# Patient Record
Sex: FEMALE | URBAN - METROPOLITAN AREA
[De-identification: names, ages, dates, MRNs, and addresses within clinical notes are randomized per-mention and may not be internally consistent; named-entity substitution may affect disease eponyms.]

---

## 2022-07-19 ENCOUNTER — OFFICE VISIT (OUTPATIENT)
Dept: URBAN - METROPOLITAN AREA CLINIC 81 | Facility: CLINIC | Age: 47
End: 2022-07-19

## 2022-07-19 DIAGNOSIS — S05.00XA CORNEAL ABRASION W/O FB OF EYE, INITIAL ENCOUNTER: Primary | ICD-10-CM

## 2022-07-19 PROCEDURE — 99203 OFFICE O/P NEW LOW 30 MIN: CPT | Performed by: OPTOMETRIST

## 2022-07-19 ASSESSMENT — INTRAOCULAR PRESSURE
OD: 10
OS: 11

## 2022-07-19 NOTE — IMPRESSION/PLAN
Impression: Corneal abrasion w/o FB of eye, initial encounter: S05.00xA. Left.
-small epi abrasion
-everted eyelid, no foreign body found Plan: Discussed diagnosis with patient in detail. Start PF Artificial tears 4-6 times a day. Will continue to observe condition and/or symptoms. Patient instructed to call if condition gets worse.

## 2025-06-27 ENCOUNTER — APPOINTMENT (OUTPATIENT)
Dept: URBAN - METROPOLITAN AREA CLINIC 94 | Facility: CLINIC | Age: 50
Setting detail: DERMATOLOGY
End: 2025-06-27

## 2025-06-27 DIAGNOSIS — B02.9 ZOSTER WITHOUT COMPLICATIONS: ICD-10-CM

## 2025-06-27 DIAGNOSIS — D492 NEOPLASM OF UNSPECIFIED NATURE OF BONE, SOFT TISSUE, AND SKIN: ICD-10-CM

## 2025-06-27 PROBLEM — R22.0 LOCALIZED SWELLING, MASS AND LUMP, HEAD: Status: ACTIVE | Noted: 2025-06-27

## 2025-06-27 PROBLEM — R22.9 LOCALIZED SWELLING, MASS AND LUMP, UNSPECIFIED: Status: ACTIVE | Noted: 2025-06-27

## 2025-06-27 PROCEDURE — ? PRESCRIPTION

## 2025-06-27 PROCEDURE — ? COUNSELING

## 2025-06-27 PROCEDURE — ? PRESCRIPTION MEDICATION MANAGEMENT

## 2025-06-27 PROCEDURE — ?

## 2025-06-27 RX ORDER — VALACYCLOVIR HYDROCHLORIDE 1 G/1
TABLET, FILM COATED ORAL TID
Qty: 21 | Refills: 3 | Status: ERX | COMMUNITY
Start: 2025-06-27

## 2025-06-27 RX ORDER — DOXYCYCLINE HYCLATE 100 MG/1
CAPSULE, GELATIN COATED ORAL
Qty: 30 | Refills: 2 | Status: ERX | COMMUNITY
Start: 2025-06-27

## 2025-06-27 RX ADMIN — DOXYCYCLINE HYCLATE: 100 CAPSULE, GELATIN COATED ORAL at 00:00

## 2025-06-27 RX ADMIN — VALACYCLOVIR HYDROCHLORIDE: 1 TABLET, FILM COATED ORAL at 00:00

## 2025-06-27 ASSESSMENT — LOCATION DETAILED DESCRIPTION DERM
LOCATION DETAILED: LEFT INFRAMAMMARY CREASE (INNER QUADRANT)
LOCATION DETAILED: INFERIOR MID FOREHEAD
LOCATION DETAILED: RIGHT RIB CAGE
LOCATION DETAILED: RIGHT LOWER CUTANEOUS LIP

## 2025-06-27 ASSESSMENT — LOCATION SIMPLE DESCRIPTION DERM
LOCATION SIMPLE: RIGHT LIP
LOCATION SIMPLE: INFERIOR FOREHEAD
LOCATION SIMPLE: LEFT BREAST
LOCATION SIMPLE: ABDOMEN

## 2025-06-27 ASSESSMENT — LOCATION ZONE DERM
LOCATION ZONE: LIP
LOCATION ZONE: FACE
LOCATION ZONE: TRUNK

## 2025-06-27 NOTE — PROCEDURE: PRESCRIPTION MEDICATION MANAGEMENT
Render In Strict Bullet Format?: No
Detail Level: Detailed
Initiate Treatment: Valtrex 1 gram tablet TID\\nSig: Take one tablet three times a day for 7 days.
Plan: Patient was referred to Dr. Jaime Fuentes for excision.\\nForehead lesion measuring ~1cm\\nUpper mid abdomen measuring ~1.5cm
Initiate Treatment: doxycycline hyclate 100 mg capsule \\nSig: Take 1 capsule daily with food
Detail Level: Zone